# Patient Record
(demographics unavailable — no encounter records)

---

## 2024-12-03 NOTE — PHYSICAL EXAM
[Alert] : alert [EOMI] : grossly EOMI [Pink Nasal Mucosa] : pink nasal mucosa [Supple] : supple [FROM] : full passive range of motion [Symmetric Chest Wall] : symmetric chest wall [Clear to Auscultation Bilaterally] : clear to auscultation bilaterally [Regular Rate and Rhythm] : regular rate and rhythm [Normal S1, S2 audible] : normal S1, S2 audible [Soft] : soft [Normal Bowel Sounds] : normal bowel sounds [Moves All Extremities x 4] : moves all extremities x4 [Warm, Well Perfused x4] : warm, well perfused x4 [Straight] : straight [Normotonic] : normotonic [+2 Patella DTR] : +2 patella DTR [Warm] : warm [Clear] : clear [Dry] : dry [Acute Distress] : no acute distress [Tenderness With Palpation of Chest Wall] : no tenderness with palpation of chest wall [Murmur] : no murmur [Tender] : nontender [Distended] : nondistended [Hepatosplenomegaly] : no hepatosplenomegaly [de-identified] : pain reproducible about the left posterior axillary line about the inferior thorax on forward flexion and left lateral rotation, otherwise no pain on backward extension, lateral rotation, or lateral flexion
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
